# Patient Record
Sex: FEMALE | Race: WHITE | NOT HISPANIC OR LATINO | Employment: FULL TIME | URBAN - METROPOLITAN AREA
[De-identification: names, ages, dates, MRNs, and addresses within clinical notes are randomized per-mention and may not be internally consistent; named-entity substitution may affect disease eponyms.]

---

## 2023-06-08 ENCOUNTER — OFFICE VISIT (OUTPATIENT)
Dept: PRIMARY CARE | Facility: CLINIC | Age: 58
End: 2023-06-08
Payer: COMMERCIAL

## 2023-06-08 VITALS
DIASTOLIC BLOOD PRESSURE: 88 MMHG | HEIGHT: 68 IN | BODY MASS INDEX: 21.98 KG/M2 | SYSTOLIC BLOOD PRESSURE: 131 MMHG | HEART RATE: 65 BPM | WEIGHT: 145 LBS

## 2023-06-08 DIAGNOSIS — Z00.00 ROUTINE GENERAL MEDICAL EXAMINATION AT A HEALTH CARE FACILITY: Primary | ICD-10-CM

## 2023-06-08 DIAGNOSIS — Z82.49 FAMILY HISTORY OF AORTIC DISSECTION: ICD-10-CM

## 2023-06-08 PROCEDURE — 93000 ELECTROCARDIOGRAM COMPLETE: CPT | Performed by: INTERNAL MEDICINE

## 2023-06-08 PROCEDURE — 99386 PREV VISIT NEW AGE 40-64: CPT | Performed by: INTERNAL MEDICINE

## 2023-06-08 PROCEDURE — 1036F TOBACCO NON-USER: CPT | Performed by: INTERNAL MEDICINE

## 2023-06-09 PROBLEM — M21.42 ACQUIRED PES PLANUS OF LEFT FOOT: Status: RESOLVED | Noted: 2023-02-01 | Resolved: 2023-06-09

## 2023-06-09 PROBLEM — N93.9 ABNORMAL UTERINE BLEEDING: Status: RESOLVED | Noted: 2023-06-09 | Resolved: 2023-06-09

## 2023-06-09 PROBLEM — M76.821 POSTERIOR TIBIAL TENDINITIS, RIGHT LEG: Status: RESOLVED | Noted: 2023-06-09 | Resolved: 2023-06-09

## 2023-06-09 PROBLEM — G43.719 INTRACTABLE CHRONIC MIGRAINE WITHOUT AURA AND WITHOUT STATUS MIGRAINOSUS: Status: ACTIVE | Noted: 2023-06-09

## 2023-06-09 PROBLEM — M77.11 RIGHT LATERAL EPICONDYLITIS: Status: RESOLVED | Noted: 2023-06-09 | Resolved: 2023-06-09

## 2023-06-09 PROBLEM — M20.21 ACQUIRED HALLUX RIGIDUS OF RIGHT FOOT: Status: RESOLVED | Noted: 2022-09-29 | Resolved: 2023-06-09

## 2023-06-09 PROBLEM — H81.10 BENIGN POSITIONAL VERTIGO: Status: RESOLVED | Noted: 2023-06-09 | Resolved: 2023-06-09

## 2023-06-09 PROBLEM — R92.8 ABNORMAL FINDING ON MAMMOGRAPHY: Status: RESOLVED | Noted: 2023-06-09 | Resolved: 2023-06-09

## 2023-06-09 PROBLEM — M67.431 GANGLION CYST OF VOLAR ASPECT OF RIGHT WRIST: Status: RESOLVED | Noted: 2023-06-09 | Resolved: 2023-06-09

## 2023-06-09 PROBLEM — G47.00 INSOMNIA: Status: ACTIVE | Noted: 2023-06-09

## 2023-06-09 PROBLEM — L40.50 PSORIATIC ARTHRITIS (MULTI): Status: ACTIVE | Noted: 2020-02-21

## 2023-06-09 PROBLEM — M76.829 TIBIALIS TENDINITIS: Status: RESOLVED | Noted: 2022-09-29 | Resolved: 2023-06-09

## 2023-06-09 PROBLEM — M26.659 TMJ ARTHROPATHY: Status: RESOLVED | Noted: 2023-06-09 | Resolved: 2023-06-09

## 2023-06-09 PROBLEM — L40.9 PSORIASIS: Status: ACTIVE | Noted: 2020-02-21

## 2023-06-09 PROBLEM — N92.0 MENORRHAGIA WITH REGULAR CYCLE: Status: RESOLVED | Noted: 2023-06-09 | Resolved: 2023-06-09

## 2023-06-09 PROBLEM — K62.5 RECTAL HEMORRHAGE: Status: RESOLVED | Noted: 2023-06-09 | Resolved: 2023-06-09

## 2023-06-09 PROBLEM — K21.9 GERD (GASTROESOPHAGEAL REFLUX DISEASE): Status: ACTIVE | Noted: 2023-06-09

## 2023-06-09 PROBLEM — M72.2 PLANTAR FASCIAL FIBROMATOSIS: Status: ACTIVE | Noted: 2022-09-29

## 2023-06-09 PROBLEM — M21.41 ACQUIRED PES PLANUS OF RIGHT FOOT: Status: RESOLVED | Noted: 2023-02-01 | Resolved: 2023-06-09

## 2023-06-09 PROBLEM — E23.6 CYST OF PITUITARY GLAND (MULTI): Status: RESOLVED | Noted: 2023-06-09 | Resolved: 2023-06-09

## 2023-06-09 PROBLEM — M76.822 POSTERIOR TIBIAL TENDINITIS, LEFT LEG: Status: RESOLVED | Noted: 2023-02-01 | Resolved: 2023-06-09

## 2023-06-09 RX ORDER — SUMATRIPTAN SUCCINATE 100 MG/1
TABLET ORAL
COMMUNITY
Start: 2017-10-10

## 2023-06-09 RX ORDER — LEFLUNOMIDE 20 MG/1
20 TABLET ORAL DAILY
COMMUNITY

## 2023-06-09 RX ORDER — VIT C/E/ZN/COPPR/LUTEIN/ZEAXAN 250MG-90MG
CAPSULE ORAL
COMMUNITY
End: 2023-11-28 | Stop reason: ALTCHOICE

## 2023-06-09 RX ORDER — GALCANEZUMAB 120 MG/ML
1 INJECTION, SOLUTION SUBCUTANEOUS
COMMUNITY
End: 2023-11-28 | Stop reason: ALTCHOICE

## 2023-06-09 ASSESSMENT — ENCOUNTER SYMPTOMS
SINUS PAIN: 0
ARTHRALGIAS: 1
DIARRHEA: 0
COLOR CHANGE: 0
NAUSEA: 0
VOMITING: 0
VOICE CHANGE: 0
DYSURIA: 0
HEADACHES: 0
BRUISES/BLEEDS EASILY: 0
ADENOPATHY: 0
CHEST TIGHTNESS: 0
FEVER: 0
HEMATURIA: 0
FATIGUE: 0
DECREASED CONCENTRATION: 0
WHEEZING: 0
JOINT SWELLING: 1
LIGHT-HEADEDNESS: 0
NECK STIFFNESS: 0
CHILLS: 0
BLOOD IN STOOL: 0
POLYPHAGIA: 0
SHORTNESS OF BREATH: 0
SINUS PRESSURE: 0
ABDOMINAL PAIN: 0
SLEEP DISTURBANCE: 0
NERVOUS/ANXIOUS: 0
ABDOMINAL DISTENTION: 0
ACTIVITY CHANGE: 0
PHOTOPHOBIA: 0
DYSPHORIC MOOD: 0
HYPERACTIVE: 0
DIFFICULTY URINATING: 0
COUGH: 0
FREQUENCY: 0
WEAKNESS: 0
NUMBNESS: 0
MYALGIAS: 1
DIZZINESS: 0
SORE THROAT: 0
PALPITATIONS: 0
EYE DISCHARGE: 0

## 2023-06-09 NOTE — PATIENT INSTRUCTIONS
It was a pleasure meeting you in the office.  At a time that is convenient for you, please obtain your bloodwork on a fasting basis.  We will then follow up on these results once available.  Colonoscopy remains up-to-date from 2021, not needing to be repeated for total of 10 years.  Please continue with routine gynecologic exams and annual mammograms.  Please check with your rheumatologist whether you can proceed with the shingles vaccine series and a tetanus vaccine update.  I have placed an order for an ultrasound of the heart, called an echo, because of your mother's history of aortic dissection.  Please schedule at your convenience.  If you have any questions, please feel free to contact us.  If all remains well otherwise, we will plan on simply seeing you annually for your wellness visits.

## 2023-06-09 NOTE — PROGRESS NOTES
Vicki Wang comes in today to establish with a new PCP and for a comprehensive physical exam.      Ms. Wang comes in today to establish with a new PCP and for a comprehensive physical exam.  She has a history of psoriatic arthritis and is followed closely by rheumatology.  She is well managed on her current injectable therapy which she recently started in the past few years.  She does follow with a dermatologist also with a neurologist for her migraines.  She feels well in general.  Her arthritis pain is her biggest issue, mainly prominent in the feet, ankles, and knees.  Again, this has improved tremendously with the injectable immuno modulators.  She does have sciatic pain, has gone through physical therapy and injections and this has improved.  Migraines are well managed with the monthly injection.  She feels well overall.  She denies any headaches, dizziness, chest pain, palpitations, shortness of breath nor cough.  She tries to keep up with her routine gynecologic exams as well as healthcare maintenance screening.  She is amenable to having lab work updated.      Review of Systems   Constitutional:  Negative for activity change, chills, fatigue and fever.   HENT:  Negative for congestion, ear pain, mouth sores, nosebleeds, sinus pressure, sinus pain, sore throat, tinnitus and voice change.    Eyes:  Negative for photophobia and discharge.   Respiratory:  Negative for cough, chest tightness, shortness of breath and wheezing.    Cardiovascular:  Negative for chest pain, palpitations and leg swelling.   Gastrointestinal:  Negative for abdominal distention, abdominal pain, blood in stool, diarrhea, nausea and vomiting.   Endocrine: Negative for cold intolerance, polyphagia and polyuria.   Genitourinary:  Negative for difficulty urinating, dysuria, frequency, hematuria, pelvic pain, vaginal bleeding and vaginal discharge.   Musculoskeletal:  Positive for arthralgias, joint swelling and myalgias. Negative for neck  stiffness.   Skin:  Negative for color change, pallor and rash.   Allergic/Immunologic: Negative for environmental allergies, food allergies and immunocompromised state.   Neurological:  Negative for dizziness, syncope, weakness, light-headedness, numbness and headaches.   Hematological:  Negative for adenopathy. Does not bruise/bleed easily.   Psychiatric/Behavioral:  Negative for behavioral problems, decreased concentration, dysphoric mood and sleep disturbance. The patient is not nervous/anxious and is not hyperactive.        Objective   Physical Exam  Constitutional:       General: She is not in acute distress.     Appearance: Normal appearance. She is well-developed. She is not ill-appearing.   HENT:      Head: Normocephalic.      Right Ear: Tympanic membrane, ear canal and external ear normal.      Left Ear: Tympanic membrane, ear canal and external ear normal.      Nose: Nose normal.      Mouth/Throat:      Mouth: Mucous membranes are moist.      Pharynx: Oropharynx is clear. No oropharyngeal exudate or posterior oropharyngeal erythema.   Eyes:      General: Lids are normal. No scleral icterus.     Extraocular Movements: Extraocular movements intact.      Conjunctiva/sclera: Conjunctivae normal.      Pupils: Pupils are equal, round, and reactive to light.   Neck:      Vascular: No carotid bruit.   Cardiovascular:      Rate and Rhythm: Normal rate and regular rhythm.      Pulses: Normal pulses.      Heart sounds: No murmur heard.  Pulmonary:      Effort: Pulmonary effort is normal. No respiratory distress.      Breath sounds: No wheezing, rhonchi or rales.   Chest:      Comments: Deferred to gyn    Abdominal:      General: Bowel sounds are normal. There is no distension.      Palpations: Abdomen is soft. There is no mass.      Tenderness: There is no abdominal tenderness. There is no guarding.   Genitourinary:     Comments: Deferred to gyn  Musculoskeletal:      Cervical back: Normal range of motion and neck  supple. No tenderness.      Right lower leg: No edema.      Left lower leg: No edema.   Lymphadenopathy:      Cervical: No cervical adenopathy.   Skin:     General: Skin is warm and dry.      Coloration: Skin is not pale.      Findings: No bruising or rash.   Neurological:      General: No focal deficit present.      Mental Status: She is alert and oriented to person, place, and time.      Cranial Nerves: No cranial nerve deficit.      Motor: No weakness.      Gait: Gait normal.   Psychiatric:         Mood and Affect: Mood normal.         Behavior: Behavior normal.         Judgment: Judgment normal.     Assessment/Plan     Full age-appropriate comprehensive physical exam and health care maintenance performed and discussed today.  Routine safety and preventative measures discussed including self breast exam, seatbelt use, no drinking and driving, no texting and driving, abstinence or cessation of tobacco use, routine dental and vision exams, healthy diet and regular exercise.    We will update comprehensive labs and follow-up on results once available.  Mammogram up-to-date, followed closely by gynecology.  DEXA screening will start age-appropriate times in the future.  EKG updated as above.  Colonoscopy is up-to-date from 2021, not needing to be repeated for total of 10 years.  I have discussed recommendations for shingles vaccine and updating tetanus vaccine.  She would like to discuss with her rheumatologist regarding coming off of immunosuppressants prior to injections.  She will notify us if she would like to proceed.  Recommend continuing with annual flu shots and COVID boosters with pneumonia vaccine at age 67.    I would like to proceed with an echo because of her mother's history of aortic dissection.  She will contact us with any additional questions or concerns, otherwise, we are happy to see her annually for her wellness visits.  Problem List Items Addressed This Visit    None  Visit Diagnoses        Routine general medical examination at a health care facility    -  Primary    Relevant Orders    ECG 12 lead (Clinic Performed)    Lipid Panel    Hemoglobin A1C    Vitamin D, Total    Vitamin B12    Iron and TIBC    TSH with reflex to Free T4 if abnormal

## 2023-07-06 ENCOUNTER — LAB (OUTPATIENT)
Dept: LAB | Facility: LAB | Age: 58
End: 2023-07-06
Payer: COMMERCIAL

## 2023-07-06 DIAGNOSIS — Z00.00 ROUTINE GENERAL MEDICAL EXAMINATION AT A HEALTH CARE FACILITY: ICD-10-CM

## 2023-07-06 LAB
CALCIDIOL (25 OH VITAMIN D3) (NG/ML) IN SER/PLAS: 144 NG/ML
CHOLESTEROL (MG/DL) IN SER/PLAS: 238 MG/DL (ref 0–199)
CHOLESTEROL IN HDL (MG/DL) IN SER/PLAS: 62.2 MG/DL
CHOLESTEROL/HDL RATIO: 3.8
COBALAMIN (VITAMIN B12) (PG/ML) IN SER/PLAS: 398 PG/ML (ref 211–911)
ESTIMATED AVERAGE GLUCOSE FOR HBA1C: 97 MG/DL
HEMOGLOBIN A1C/HEMOGLOBIN TOTAL IN BLOOD: 5 %
IRON (UG/DL) IN SER/PLAS: 155 UG/DL (ref 35–150)
IRON BINDING CAPACITY (UG/DL) IN SER/PLAS: 336 UG/DL (ref 240–445)
IRON SATURATION (%) IN SER/PLAS: 46 % (ref 25–45)
LDL: 159 MG/DL (ref 0–99)
THYROTROPIN (MIU/L) IN SER/PLAS BY DETECTION LIMIT <= 0.05 MIU/L: 2.75 MIU/L (ref 0.44–3.98)
TRIGLYCERIDE (MG/DL) IN SER/PLAS: 86 MG/DL (ref 0–149)
VLDL: 17 MG/DL (ref 0–40)

## 2023-07-06 PROCEDURE — 83036 HEMOGLOBIN GLYCOSYLATED A1C: CPT

## 2023-07-06 PROCEDURE — 82607 VITAMIN B-12: CPT

## 2023-07-06 PROCEDURE — 36415 COLL VENOUS BLD VENIPUNCTURE: CPT

## 2023-07-06 PROCEDURE — 80061 LIPID PANEL: CPT

## 2023-07-06 PROCEDURE — 82306 VITAMIN D 25 HYDROXY: CPT

## 2023-07-06 PROCEDURE — 83550 IRON BINDING TEST: CPT

## 2023-07-06 PROCEDURE — 83540 ASSAY OF IRON: CPT

## 2023-07-06 PROCEDURE — 84443 ASSAY THYROID STIM HORMONE: CPT

## 2023-09-05 ENCOUNTER — OFFICE VISIT (OUTPATIENT)
Dept: PRIMARY CARE | Facility: CLINIC | Age: 58
End: 2023-09-05
Payer: COMMERCIAL

## 2023-09-05 ENCOUNTER — LAB (OUTPATIENT)
Dept: LAB | Facility: LAB | Age: 58
End: 2023-09-05
Payer: COMMERCIAL

## 2023-09-05 DIAGNOSIS — B34.9 VIRAL ILLNESS: ICD-10-CM

## 2023-09-05 DIAGNOSIS — R10.13 DYSPEPSIA: ICD-10-CM

## 2023-09-05 DIAGNOSIS — R20.0 NUMBNESS AND TINGLING OF LEFT LEG: Primary | ICD-10-CM

## 2023-09-05 DIAGNOSIS — M53.3 SI (SACROILIAC) JOINT DYSFUNCTION: ICD-10-CM

## 2023-09-05 DIAGNOSIS — R20.0 NUMBNESS AND TINGLING OF LEFT LEG: ICD-10-CM

## 2023-09-05 DIAGNOSIS — R20.2 NUMBNESS AND TINGLING OF LEFT LEG: ICD-10-CM

## 2023-09-05 DIAGNOSIS — R20.2 NUMBNESS AND TINGLING OF LEFT LEG: Primary | ICD-10-CM

## 2023-09-05 LAB
ANION GAP IN SER/PLAS: 12 MMOL/L (ref 10–20)
BASOPHILS (10*3/UL) IN BLOOD BY AUTOMATED COUNT: 0.04 X10E9/L (ref 0–0.1)
BASOPHILS/100 LEUKOCYTES IN BLOOD BY AUTOMATED COUNT: 1.2 % (ref 0–2)
CALCIUM (MG/DL) IN SER/PLAS: 9.6 MG/DL (ref 8.6–10.6)
CARBON DIOXIDE, TOTAL (MMOL/L) IN SER/PLAS: 28 MMOL/L (ref 21–32)
CHLORIDE (MMOL/L) IN SER/PLAS: 103 MMOL/L (ref 98–107)
COBALAMIN (VITAMIN B12) (PG/ML) IN SER/PLAS: 305 PG/ML (ref 211–911)
CREATININE (MG/DL) IN SER/PLAS: 0.96 MG/DL (ref 0.5–1.05)
EOSINOPHILS (10*3/UL) IN BLOOD BY AUTOMATED COUNT: 0.05 X10E9/L (ref 0–0.7)
EOSINOPHILS/100 LEUKOCYTES IN BLOOD BY AUTOMATED COUNT: 1.5 % (ref 0–6)
ERYTHROCYTE DISTRIBUTION WIDTH (RATIO) BY AUTOMATED COUNT: 11.9 % (ref 11.5–14.5)
ERYTHROCYTE MEAN CORPUSCULAR HEMOGLOBIN CONCENTRATION (G/DL) BY AUTOMATED: 32.9 G/DL (ref 32–36)
ERYTHROCYTE MEAN CORPUSCULAR VOLUME (FL) BY AUTOMATED COUNT: 93 FL (ref 80–100)
ERYTHROCYTES (10*6/UL) IN BLOOD BY AUTOMATED COUNT: 4.54 X10E12/L (ref 4–5.2)
GFR FEMALE: 70 ML/MIN/1.73M2
GLUCOSE (MG/DL) IN SER/PLAS: 91 MG/DL (ref 74–99)
HEMATOCRIT (%) IN BLOOD BY AUTOMATED COUNT: 42.2 % (ref 36–46)
HEMOGLOBIN (G/DL) IN BLOOD: 13.9 G/DL (ref 12–16)
IMMATURE GRANULOCYTES/100 LEUKOCYTES IN BLOOD BY AUTOMATED COUNT: 0 % (ref 0–0.9)
LEUKOCYTES (10*3/UL) IN BLOOD BY AUTOMATED COUNT: 3.3 X10E9/L (ref 4.4–11.3)
LYMPHOCYTES (10*3/UL) IN BLOOD BY AUTOMATED COUNT: 1.07 X10E9/L (ref 1.2–4.8)
LYMPHOCYTES/100 LEUKOCYTES IN BLOOD BY AUTOMATED COUNT: 32.4 % (ref 13–44)
MAGNESIUM (MG/DL) IN SER/PLAS: 2.18 MG/DL (ref 1.6–2.4)
MONOCYTES (10*3/UL) IN BLOOD BY AUTOMATED COUNT: 0.36 X10E9/L (ref 0.1–1)
MONOCYTES/100 LEUKOCYTES IN BLOOD BY AUTOMATED COUNT: 10.9 % (ref 2–10)
NEUTROPHILS (10*3/UL) IN BLOOD BY AUTOMATED COUNT: 1.78 X10E9/L (ref 1.2–7.7)
NEUTROPHILS/100 LEUKOCYTES IN BLOOD BY AUTOMATED COUNT: 54 % (ref 40–80)
NRBC (PER 100 WBCS) BY AUTOMATED COUNT: 0 /100 WBC (ref 0–0)
PLATELETS (10*3/UL) IN BLOOD AUTOMATED COUNT: 217 X10E9/L (ref 150–450)
POTASSIUM (MMOL/L) IN SER/PLAS: 4.3 MMOL/L (ref 3.5–5.3)
SODIUM (MMOL/L) IN SER/PLAS: 139 MMOL/L (ref 136–145)
THYROTROPIN (MIU/L) IN SER/PLAS BY DETECTION LIMIT <= 0.05 MIU/L: 3.85 MIU/L (ref 0.44–3.98)
UREA NITROGEN (MG/DL) IN SER/PLAS: 18 MG/DL (ref 6–23)

## 2023-09-05 PROCEDURE — 85025 COMPLETE CBC W/AUTO DIFF WBC: CPT

## 2023-09-05 PROCEDURE — 82607 VITAMIN B-12: CPT

## 2023-09-05 PROCEDURE — 36415 COLL VENOUS BLD VENIPUNCTURE: CPT

## 2023-09-05 PROCEDURE — 99214 OFFICE O/P EST MOD 30 MIN: CPT | Performed by: INTERNAL MEDICINE

## 2023-09-05 PROCEDURE — 1036F TOBACCO NON-USER: CPT | Performed by: INTERNAL MEDICINE

## 2023-09-05 PROCEDURE — 80048 BASIC METABOLIC PNL TOTAL CA: CPT

## 2023-09-05 PROCEDURE — 83735 ASSAY OF MAGNESIUM: CPT

## 2023-09-05 PROCEDURE — 84443 ASSAY THYROID STIM HORMONE: CPT

## 2023-09-05 ASSESSMENT — ENCOUNTER SYMPTOMS
BACK PAIN: 1
VOMITING: 0
HEADACHES: 1
ABDOMINAL DISTENTION: 0
PALPITATIONS: 0
NUMBNESS: 1
NAUSEA: 1
WHEEZING: 0
DIARRHEA: 1
ABDOMINAL PAIN: 1
WEAKNESS: 0
LIGHT-HEADEDNESS: 0
SHORTNESS OF BREATH: 0
CHEST TIGHTNESS: 0
FATIGUE: 1
ARTHRALGIAS: 1
ACTIVITY CHANGE: 0
FEVER: 1
DIZZINESS: 0
COUGH: 0

## 2023-09-05 NOTE — PROGRESS NOTES
"Vicki Wang comes in today for foot tingling.      Ms. Wang comes in today with concerns of foot tingling.  She states that this has been going on for about 2 weeks.  She always has had some SI joint and sciatic issues and has worked with physical therapy in the past.  This seems to be typically managed reasonably well, but again recently has had this nonspecific tingling.  This will come and go, sometimes in her lower leg, sometimes in her thigh, but mainly she feels just a slight tingling in her third toe on her left foot.  She states that she has had a diagnosis of \"peripheral neuropathy\" in the past.    Also about 1 week ago, she had an nonspecific viral type illness.  She became extremely tired, had a headache, and a suspected fever (not documented).  She had a decreased appetite.  She had diarrhea for 24 hours.  She started getting better but then had some wine over the weekend and this caused quite a bit of upper GI dyspepsia symptoms.  She feels as though sometimes food or liquid has a tough time passing into her stomach, but not a true dysphagia.  Her  had a similar episode as well, seemingly something viral and improving currently but not quite back to baseline.  Her lab work in June all looked reassuring.  Vitamin D was significantly high and she has since stopped taking vitamin D supplements.  She continues to follow with her specialists.  She denies any additional concerns at this time that are new.        Review of Systems   Constitutional:  Positive for fatigue and fever. Negative for activity change.   Respiratory:  Negative for cough, chest tightness, shortness of breath and wheezing.    Cardiovascular:  Negative for chest pain, palpitations and leg swelling.   Gastrointestinal:  Positive for abdominal pain, diarrhea (resolved) and nausea (resolved). Negative for abdominal distention and vomiting.   Musculoskeletal:  Positive for arthralgias and back pain.   Neurological:  Positive for " numbness and headaches. Negative for dizziness, syncope, weakness and light-headedness.       Objective   Physical Exam  Constitutional:       General: She is not in acute distress.     Appearance: She is normal weight. She is not diaphoretic.   Cardiovascular:      Rate and Rhythm: Normal rate and regular rhythm.      Heart sounds: No murmur heard.     No gallop.   Pulmonary:      Effort: Pulmonary effort is normal. No respiratory distress.   Abdominal:      General: Abdomen is flat. Bowel sounds are normal. There is no distension.      Tenderness: There is no abdominal tenderness. There is no guarding.      Hernia: No hernia is present.   Musculoskeletal:      Right lower leg: No edema.      Left lower leg: No edema.   Neurological:      General: No focal deficit present.      Mental Status: She is alert. Mental status is at baseline.      Cranial Nerves: No cranial nerve deficit.      Motor: No weakness.      Gait: Gait normal.      Deep Tendon Reflexes: Reflexes normal.         Assessment/Plan   1.  Numbness and tingling of the left leg with history of SI joint dysfunction: I suspect that this is all related to SI joint spasm.  Referral placed back for physical therapy as this has been helpful for her in the past.  Could consider lumbar x-rays if symptoms persist.  2.  Dyspepsia: Likely related to recent viral illness.  Have recommended over-the-counter PPI therapy.  Contact us if symptoms persist or worsen.  Update labs to ensure no change.  3.  Viral illness: Seems to be resolving.  Have recommended hydrating fluids.  Update comprehensive labs.    Happy to see her back if symptoms persist or worsen.  Otherwise, annual visits as scheduled.  She is to call with any questions.  Problem List Items Addressed This Visit    None

## 2023-09-05 NOTE — PATIENT INSTRUCTIONS
We will follow-up on lab studies.  Referral for physical therapy has been placed as well.  Please continue with hydrating fluids.  If symptoms continue or worsen, please contact us promptly.  Otherwise, we will follow-up as previously scheduled.

## 2023-11-13 ENCOUNTER — SPECIALTY PHARMACY (OUTPATIENT)
Dept: PHARMACY | Facility: CLINIC | Age: 58
End: 2023-11-13

## 2023-11-13 ENCOUNTER — PHARMACY VISIT (OUTPATIENT)
Dept: PHARMACY | Facility: CLINIC | Age: 58
End: 2023-11-13
Payer: COMMERCIAL

## 2023-11-14 ENCOUNTER — SPECIALTY PHARMACY (OUTPATIENT)
Dept: PHARMACY | Facility: CLINIC | Age: 58
End: 2023-11-14

## 2023-11-17 ENCOUNTER — PHARMACY VISIT (OUTPATIENT)
Dept: PHARMACY | Facility: CLINIC | Age: 58
End: 2023-11-17
Payer: COMMERCIAL

## 2023-11-17 PROCEDURE — RXMED WILLOW AMBULATORY MEDICATION CHARGE

## 2023-11-27 ENCOUNTER — TELEPHONE (OUTPATIENT)
Dept: NEUROLOGY | Facility: CLINIC | Age: 58
End: 2023-11-27

## 2023-11-28 ENCOUNTER — TELEMEDICINE (OUTPATIENT)
Dept: NEUROLOGY | Facility: CLINIC | Age: 58
End: 2023-11-28
Payer: COMMERCIAL

## 2023-11-28 ENCOUNTER — APPOINTMENT (OUTPATIENT)
Dept: NEUROLOGY | Facility: CLINIC | Age: 58
End: 2023-11-28
Payer: COMMERCIAL

## 2023-11-28 DIAGNOSIS — G43.719 INTRACTABLE CHRONIC MIGRAINE WITHOUT AURA AND WITHOUT STATUS MIGRAINOSUS: Primary | ICD-10-CM

## 2023-11-28 PROCEDURE — 99213 OFFICE O/P EST LOW 20 MIN: CPT | Performed by: NURSE PRACTITIONER

## 2023-11-28 NOTE — PATIENT INSTRUCTIONS
Migraines:  Suggestions for preventing or controlling migraines:  - Riboflavin (vitamin B2) 200 mg twice a day may be helpful. Side effects can include diarrhea, increased urination and bright yellow urine. This should not be taken by kids.   - CoQ10 100 mg daily up to three times per day may also be helpful. Side effects can include nausea, diarrhea, and dyspepsia.   - Magnesium (oxelate) 400- 600 mg daily for prevention. Magnesium can also help with menstrual migraines. Side effects can include diarrhea and flushing.   - According to the American Academy of Neurology, there is not sufficient evidence that melatonin helps prevent or treat migraines, so it is not currently recommended for this use. Butterbur is also not currently recommended for migraine prevention as it can cause liver toxicity.   - Avoid triggers that can cause or worsen migraines (food, lack of sleep, stress, etc.)  - Headache frequency is noted to be increased in those with low physical activity, poor sleep, high BMI, smoking, and caffeine overuse. Managing stress with relaxation techniques and getting 20-30 minutes of aerobic exercise at least 4 times per week can help decrease headache frequency and intensity.   - Keep a diary of your headaches to note triggers, how often you get them, how long they last, associated symptoms, what medicines you took and if they helped, and any other helpful information   - Avoid taking rescue medication (NOT preventative medication) more than 3 days a week (includes both prescription and over the counter meds)  - Take your preventative medication as directed. Let me know if you have side effects or problems with the medication. Do not suddenly stop the medication.

## 2023-11-28 NOTE — PROGRESS NOTES
SUBJECTIVE    Vicki Wang is a 58 y.o. right-handed female who presents with   Chief Complaint   Patient presents with    Migraine      .    Presents for follow up visit  Visit type: virtual visit Virtual or Telephone Consent    An interactive audio and video telecommunication system which permits real time communications between the patient (at the originating site) and provider (at the distant site) was utilized to provide this telehealth service.   Verbal consent was requested and obtained from Vicki Wang on this date, 11/28/23 for a telehealth visit.     HISTORY OF PRESENT ILLNESS    RECAP:  Former patient of Dr. Driver.    Previous Treatment:   Abortive therapy tried: Triptans , Relpax ( caused exhaustion/tiredness), Sumatriptan, Treximet, Cambia     Prophylaxis tried:   Topamax (rash), gabapentin (dizzy, fatigued), tizanidine (fatigued) , nortriptyline (insomnia.) Has also tried butterbur and magnesium with no benefit.     12/13/22:     Emgality working great. No breakthrough migraines since starting it. Has not needed to use any sumatriptan.      Pt is doing well, denies any other complaints. Asking about safety of Emgality and drug class.    11/28/23 -     Migraines/HA:  She does not currently have a migraine. Since the last visit, migraines are no change.  Headaches are typically located: right-sided unilateral, left-sided unilateral, frontal   Describes as: sharp  Currently frequency: rare   Baseline frequency:  1-2 times per week   Vicki does not experience headache aura.  Associated symptoms: photophobia  Symptoms associated with increased intracranial pressure: No increased intracranial pressure symptoms  Autonomic symptoms: None  Triggers: Patient is aware of none, used to be red wine or alcohol, no triggers currently.   Her migraines do not seem to be related to any time of day or year      REVIEW OF SYSTEMS:  10 point review of systems performed and is negative except as noted in the HPI.      Past Medical History:   Diagnosis Date    Abnormal uterine bleeding 06/09/2023    Acquired pes planus of left foot 02/01/2023    Acquired pes planus of right foot 02/01/2023    Baker's cyst, left     Benign positional vertigo 06/09/2023    Ganglion cyst of volar aspect of right wrist 06/09/2023    Menorrhagia with regular cycle 06/09/2023    Posterior tibial tendinitis, left leg 02/01/2023    Posterior tibial tendinitis, right leg 06/09/2023    Rectal hemorrhage 06/09/2023    Right lateral epicondylitis 06/09/2023    Tibialis tendinitis 09/29/2022    TMJ arthropathy 06/09/2023       Family medical history includes migraines in sister.    Past Surgical History:   Procedure Laterality Date    DILATION AND CURETTAGE OF UTERUS  03/19/2018    Dilation And Curettage    GANGLION CYST EXCISION      WISDOM TOOTH EXTRACTION         Social History     Substance and Sexual Activity   Drug Use Not on file     Tobacco Use: Medium Risk (11/28/2023)    Patient History     Smoking Tobacco Use: Former     Smokeless Tobacco Use: Never     Passive Exposure: Not on file     Alcohol Use: Not on file       Current Outpatient Medications on File Prior to Visit   Medication Sig Dispense Refill    leflunomide (Arava) 20 mg tablet Take 1 tablet (20 mg) by mouth once daily.      SUMAtriptan (Imitrex) 100 mg tablet Take by mouth.      [DISCONTINUED] galcanezumab (Emgality) 120 mg/mL auto-injector INJECT 120 MG (1 PEN) UNDER THE SKIN ONCE A MONTH AS DIRECTED. 1 mL 11    [DISCONTINUED] cholecalciferol (Vitamin D-3) 25 MCG (1000 UT) capsule Take by mouth.      [DISCONTINUED] fish oil concentrate (Omega-3) 120-180 mg capsule Take by mouth.      [DISCONTINUED] galcanezumab (Emgality Syringe) 120 mg/mL prefilled syringe Inject 1 Syringe (120 mg) under the skin.       No current facility-administered medications on file prior to visit.         OBJECTIVE    There were no vitals taken for this visit.      Physical Exam  Eyes:      General: Lids are  normal.      Extraocular Movements: Extraocular movements intact.   Psychiatric:         Speech: Speech normal.       Neurological Exam  Mental Status  Awake, alert and oriented to person, place and time. Oriented to person, place, time and situation. Recent and remote memory are intact. Speech is normal. Language is fluent with no aphasia. Attention and concentration are normal. Fund of knowledge is appropriate for level of education.    Cranial Nerves  CN III, IV, VI: Extraocular movements intact bilaterally. Normal lids and orbits bilaterally.  CN VII: Full and symmetric facial movement.  CN VIII: Hearing is normal.    Motor   No abnormal involuntary movements.        No results found for this or any previous visit from the past 1825 days.      Below is the patient's most recent value for Albumin, ALT, AST, BUN, Calcium, Chloride, Cholesterol, CO2, Creatinine, GFR, Glucose, HDL, Hematocrit, Hemoglobin, Hemoglobin A1C, LDL, Magnesium, Phosphorus, Platelets, Potassium, PSA, Sodium, Triglycerides, and WBC.   Lab Results   Component Value Date    BUN 18 09/05/2023    CALCIUM 9.6 09/05/2023     09/05/2023    CHOL 238 (H) 07/06/2023    CO2 28 09/05/2023    CREATININE 0.96 09/05/2023    HDL 62.2 07/06/2023    HCT 42.2 09/05/2023    HGB 13.9 09/05/2023    HGBA1C 5.0 07/06/2023    MG 2.18 09/05/2023     09/05/2023    K 4.3 09/05/2023     09/05/2023    TRIG 86 07/06/2023    WBC 3.3 (L) 09/05/2023     Note: for a comprehensive list of the patient's lab results, access the Results Review activity.      ASSESSMENT & PLAN  Problem List Items Addressed This Visit       Intractable chronic migraine without aura and without status migrainosus - Primary    Overview     Currently well controlled on Emgality monthly injection.   Failed topiramate, gabapentin, nortriptyline.  Failed relpax, sumatriptan, Treximet, Cambia and tizanidine.          Current Assessment & Plan     Continue Emgality monthly. [facility]   FU in 1 year.          Relevant Medications    galcanezumab (Emgality) 120 mg/mL auto-injector         FU in 1 year

## 2023-12-07 ENCOUNTER — SPECIALTY PHARMACY (OUTPATIENT)
Dept: PHARMACY | Facility: CLINIC | Age: 58
End: 2023-12-07

## 2023-12-11 ENCOUNTER — SPECIALTY PHARMACY (OUTPATIENT)
Dept: PHARMACY | Facility: CLINIC | Age: 58
End: 2023-12-11

## 2023-12-19 ENCOUNTER — SPECIALTY PHARMACY (OUTPATIENT)
Dept: PHARMACY | Facility: CLINIC | Age: 58
End: 2023-12-19

## 2023-12-20 DIAGNOSIS — G43.719 INTRACTABLE CHRONIC MIGRAINE WITHOUT AURA AND WITHOUT STATUS MIGRAINOSUS: Primary | ICD-10-CM

## 2024-01-11 DIAGNOSIS — G43.719 INTRACTABLE CHRONIC MIGRAINE WITHOUT AURA AND WITHOUT STATUS MIGRAINOSUS: Primary | ICD-10-CM

## 2024-01-15 RX ORDER — GALCANEZUMAB 120 MG/ML
INJECTION, SOLUTION SUBCUTANEOUS
Qty: 1 ML | Refills: 11 | Status: SHIPPED | OUTPATIENT
Start: 2024-01-15 | End: 2025-01-13

## 2024-01-16 PROCEDURE — RXMED WILLOW AMBULATORY MEDICATION CHARGE

## 2024-01-17 ENCOUNTER — PHARMACY VISIT (OUTPATIENT)
Dept: PHARMACY | Facility: CLINIC | Age: 59
End: 2024-01-17
Payer: COMMERCIAL

## 2024-01-23 ENCOUNTER — SPECIALTY PHARMACY (OUTPATIENT)
Dept: PHARMACY | Facility: CLINIC | Age: 59
End: 2024-01-23

## 2024-02-09 PROCEDURE — RXMED WILLOW AMBULATORY MEDICATION CHARGE

## 2024-02-14 ENCOUNTER — PHARMACY VISIT (OUTPATIENT)
Dept: PHARMACY | Facility: CLINIC | Age: 59
End: 2024-02-14
Payer: COMMERCIAL

## 2024-03-07 PROCEDURE — RXMED WILLOW AMBULATORY MEDICATION CHARGE

## 2024-03-08 ENCOUNTER — SPECIALTY PHARMACY (OUTPATIENT)
Dept: PHARMACY | Facility: CLINIC | Age: 59
End: 2024-03-08

## 2024-03-11 ENCOUNTER — PHARMACY VISIT (OUTPATIENT)
Dept: PHARMACY | Facility: CLINIC | Age: 59
End: 2024-03-11
Payer: COMMERCIAL

## 2024-04-03 ENCOUNTER — SPECIALTY PHARMACY (OUTPATIENT)
Dept: PHARMACY | Facility: CLINIC | Age: 59
End: 2024-04-03

## 2024-04-03 PROCEDURE — RXMED WILLOW AMBULATORY MEDICATION CHARGE

## 2024-04-09 ENCOUNTER — PHARMACY VISIT (OUTPATIENT)
Dept: PHARMACY | Facility: CLINIC | Age: 59
End: 2024-04-09
Payer: COMMERCIAL

## 2024-05-03 ENCOUNTER — SPECIALTY PHARMACY (OUTPATIENT)
Dept: PHARMACY | Facility: CLINIC | Age: 59
End: 2024-05-03

## 2024-05-03 PROCEDURE — RXMED WILLOW AMBULATORY MEDICATION CHARGE

## 2024-05-16 ENCOUNTER — SPECIALTY PHARMACY (OUTPATIENT)
Dept: PHARMACY | Facility: CLINIC | Age: 59
End: 2024-05-16

## 2024-05-16 ENCOUNTER — PHARMACY VISIT (OUTPATIENT)
Dept: PHARMACY | Facility: CLINIC | Age: 59
End: 2024-05-16
Payer: COMMERCIAL

## 2024-06-10 PROCEDURE — RXMED WILLOW AMBULATORY MEDICATION CHARGE

## 2024-06-14 ENCOUNTER — PHARMACY VISIT (OUTPATIENT)
Dept: PHARMACY | Facility: CLINIC | Age: 59
End: 2024-06-14
Payer: COMMERCIAL

## 2024-06-24 ENCOUNTER — APPOINTMENT (OUTPATIENT)
Dept: OBSTETRICS AND GYNECOLOGY | Facility: CLINIC | Age: 59
End: 2024-06-24
Payer: COMMERCIAL

## 2024-06-24 VITALS
DIASTOLIC BLOOD PRESSURE: 84 MMHG | HEIGHT: 68 IN | BODY MASS INDEX: 21.37 KG/M2 | SYSTOLIC BLOOD PRESSURE: 124 MMHG | WEIGHT: 141 LBS

## 2024-06-24 DIAGNOSIS — R14.0 ABDOMINAL BLOATING: ICD-10-CM

## 2024-06-24 DIAGNOSIS — Z01.419 WELL WOMAN EXAM: Primary | ICD-10-CM

## 2024-06-24 DIAGNOSIS — R35.0 URINARY FREQUENCY: ICD-10-CM

## 2024-06-24 DIAGNOSIS — Z12.31 BREAST CANCER SCREENING BY MAMMOGRAM: ICD-10-CM

## 2024-06-24 PROCEDURE — 99213 OFFICE O/P EST LOW 20 MIN: CPT | Performed by: OBSTETRICS & GYNECOLOGY

## 2024-06-24 PROCEDURE — 1036F TOBACCO NON-USER: CPT | Performed by: OBSTETRICS & GYNECOLOGY

## 2024-06-24 PROCEDURE — 99396 PREV VISIT EST AGE 40-64: CPT | Performed by: OBSTETRICS & GYNECOLOGY

## 2024-06-24 ASSESSMENT — ENCOUNTER SYMPTOMS
ABDOMINAL DISTENTION: 0
CHILLS: 0
COLOR CHANGE: 0
ABDOMINAL PAIN: 0
FLANK PAIN: 0
FATIGUE: 0
BACK PAIN: 0
DYSURIA: 0
SHORTNESS OF BREATH: 0
FREQUENCY: 0
NAUSEA: 0
DIARRHEA: 0
HEMATURIA: 0
VOMITING: 0
APPETITE CHANGE: 0
BLOOD IN STOOL: 0
SLEEP DISTURBANCE: 0
FEVER: 0
UNEXPECTED WEIGHT CHANGE: 0
CONSTIPATION: 0

## 2024-06-24 ASSESSMENT — PAIN SCALES - GENERAL: PAINLEVEL: 0-NO PAIN

## 2024-06-24 NOTE — PROGRESS NOTES
"History Of Present Illness  Routine Gyn Exam  Vicki Wang here for routine WWE.  Pt is postmenopausal.  Denies spotting or bleeding.     Concerns: bloating, urinary frequency .     New health issues or surgeries since last visit: denies .  Exercise: regular exercise, rowing .     Gynecologic History  Postmenopausal.  Sexually active: yes.  Last Pap: .   Last mammogram: .   Last Colonoscopy:  up to date per patient .       Obstetric History  OB History    Para Term  AB Living   3       1 2   SAB IAB Ectopic Multiple Live Births   1              # Outcome Date GA Lbr Damian/2nd Weight Sex Delivery Anes PTL Lv   3             2             1 SAB                 Review of Systems   Constitutional:  Negative for appetite change, chills, fatigue, fever and unexpected weight change.   Respiratory:  Negative for shortness of breath.    Cardiovascular:  Negative for chest pain.   Gastrointestinal:  Negative for abdominal distention, abdominal pain, blood in stool, constipation, diarrhea, nausea and vomiting.   Endocrine: Negative for cold intolerance and heat intolerance.   Genitourinary:  Negative for dyspareunia, dysuria, flank pain, frequency, genital sores, hematuria, menstrual problem, pelvic pain, urgency, vaginal bleeding, vaginal discharge and vaginal pain.   Musculoskeletal:  Negative for back pain.   Skin:  Negative for color change.   Psychiatric/Behavioral:  Negative for sleep disturbance.        /84 (BP Location: Left arm, Patient Position: Sitting)   Ht 1.727 m (5' 8\")   Wt 64 kg (141 lb)   LMP 2020   BMI 21.44 kg/m²      Physical Exam  Constitutional:       Appearance: Normal appearance.   HENT:      Head: Normocephalic and atraumatic.   Chest:   Breasts:     Right: Normal.      Left: Normal.   Abdominal:      General: Abdomen is flat.      Palpations: Abdomen is soft.      Tenderness: There is no abdominal tenderness.   Genitourinary:     General: Normal vulva. "      Vagina: Normal.      Cervix: Normal.      Uterus: Normal.       Adnexa: Right adnexa normal and left adnexa normal.   Skin:     General: Skin is warm and dry.   Neurological:      Mental Status: She is alert and oriented to person, place, and time.   Psychiatric:         Mood and Affect: Mood normal.              Assessment/Plan         Routine Well Woman Exam Today  Discussed diet and exercise.   Reviewed routine health screenings.   Pap: 2023 wnl   Recommend annual mammograms. Mammogram ordered for 2024. Pt to schedule  Currently up to date on colon cancer screening.          Urinary frequency and bloating  Normal pelvic exam today  U/A wnl  TATV US ordered  Consider pelvic floor PT vs Urogyn consult  Pt encouraged to changes to BM With PCP          Beth Gipson MD

## 2024-07-05 ENCOUNTER — HOSPITAL ENCOUNTER (OUTPATIENT)
Dept: RADIOLOGY | Facility: CLINIC | Age: 59
Discharge: HOME | End: 2024-07-05
Payer: COMMERCIAL

## 2024-07-05 DIAGNOSIS — R14.0 ABDOMINAL BLOATING: ICD-10-CM

## 2024-07-05 DIAGNOSIS — R35.0 URINARY FREQUENCY: ICD-10-CM

## 2024-07-05 PROCEDURE — 76856 US EXAM PELVIC COMPLETE: CPT

## 2024-07-08 DIAGNOSIS — R35.0 URINARY FREQUENCY: Primary | ICD-10-CM

## 2024-07-08 NOTE — PROGRESS NOTES
Reviewed normal pelvic US results with patient.   Pt continues to have urinary frequency and possibly feelings of not emptying  bladder fully.  Not happening every day but more frequent.   Pt will start voiding diary and consider follow up with Urogyn.

## 2024-07-16 ENCOUNTER — SPECIALTY PHARMACY (OUTPATIENT)
Dept: PHARMACY | Facility: CLINIC | Age: 59
End: 2024-07-16

## 2024-07-16 ENCOUNTER — PHARMACY VISIT (OUTPATIENT)
Dept: PHARMACY | Facility: CLINIC | Age: 59
End: 2024-07-16
Payer: COMMERCIAL

## 2024-07-16 PROCEDURE — RXMED WILLOW AMBULATORY MEDICATION CHARGE

## 2024-07-17 ENCOUNTER — HOSPITAL ENCOUNTER (OUTPATIENT)
Dept: RADIOLOGY | Facility: CLINIC | Age: 59
Discharge: HOME | End: 2024-07-17
Payer: COMMERCIAL

## 2024-07-17 VITALS — HEIGHT: 68 IN | WEIGHT: 140 LBS | BODY MASS INDEX: 21.22 KG/M2

## 2024-07-17 DIAGNOSIS — Z12.31 BREAST CANCER SCREENING BY MAMMOGRAM: ICD-10-CM

## 2024-07-17 PROCEDURE — 77067 SCR MAMMO BI INCL CAD: CPT

## 2024-08-13 ENCOUNTER — SPECIALTY PHARMACY (OUTPATIENT)
Dept: PHARMACY | Facility: CLINIC | Age: 59
End: 2024-08-13

## 2024-08-13 PROCEDURE — RXMED WILLOW AMBULATORY MEDICATION CHARGE

## 2024-08-14 ENCOUNTER — PHARMACY VISIT (OUTPATIENT)
Dept: PHARMACY | Facility: CLINIC | Age: 59
End: 2024-08-14
Payer: COMMERCIAL

## 2024-09-10 ENCOUNTER — SPECIALTY PHARMACY (OUTPATIENT)
Dept: PHARMACY | Facility: CLINIC | Age: 59
End: 2024-09-10

## 2024-09-10 PROCEDURE — RXMED WILLOW AMBULATORY MEDICATION CHARGE

## 2024-09-19 ENCOUNTER — PHARMACY VISIT (OUTPATIENT)
Dept: PHARMACY | Facility: CLINIC | Age: 59
End: 2024-09-19
Payer: COMMERCIAL

## 2024-10-15 ENCOUNTER — APPOINTMENT (OUTPATIENT)
Dept: PRIMARY CARE | Facility: CLINIC | Age: 59
End: 2024-10-15
Payer: COMMERCIAL

## 2024-10-15 VITALS
HEART RATE: 68 BPM | DIASTOLIC BLOOD PRESSURE: 81 MMHG | BODY MASS INDEX: 22.42 KG/M2 | WEIGHT: 147.4 LBS | SYSTOLIC BLOOD PRESSURE: 119 MMHG

## 2024-10-15 DIAGNOSIS — L40.50 PSORIATIC ARTHRITIS (MULTI): ICD-10-CM

## 2024-10-15 DIAGNOSIS — D72.819 LEUKOPENIA, UNSPECIFIED TYPE: ICD-10-CM

## 2024-10-15 DIAGNOSIS — R19.4 ALTERED BOWEL HABITS: Primary | ICD-10-CM

## 2024-10-15 DIAGNOSIS — Z23 NEED FOR INFLUENZA VACCINATION: ICD-10-CM

## 2024-10-15 DIAGNOSIS — R39.15 URINARY URGENCY: ICD-10-CM

## 2024-10-15 PROBLEM — G62.9 PERIPHERAL NERVE DISEASE: Status: RESOLVED | Noted: 2024-10-15 | Resolved: 2024-10-15

## 2024-10-15 PROBLEM — M51.9 DISORDER OF INTERVERTEBRAL DISC: Status: RESOLVED | Noted: 2024-10-15 | Resolved: 2024-10-15

## 2024-10-15 PROBLEM — G44.209 MUSCLE CONTRACTION HEADACHE: Status: ACTIVE | Noted: 2024-10-15

## 2024-10-15 PROBLEM — M79.601 CHRONIC PAIN OF RIGHT UPPER EXTREMITY: Status: RESOLVED | Noted: 2024-10-15 | Resolved: 2024-10-15

## 2024-10-15 PROBLEM — G89.29 CHRONIC PAIN OF RIGHT UPPER EXTREMITY: Status: RESOLVED | Noted: 2024-10-15 | Resolved: 2024-10-15

## 2024-10-15 PROBLEM — M67.439 GANGLION OF WRIST: Status: RESOLVED | Noted: 2024-10-15 | Resolved: 2024-10-15

## 2024-10-15 PROCEDURE — 99214 OFFICE O/P EST MOD 30 MIN: CPT | Performed by: INTERNAL MEDICINE

## 2024-10-15 PROCEDURE — 90656 IIV3 VACC NO PRSV 0.5 ML IM: CPT | Performed by: INTERNAL MEDICINE

## 2024-10-15 PROCEDURE — 1036F TOBACCO NON-USER: CPT | Performed by: INTERNAL MEDICINE

## 2024-10-15 PROCEDURE — 90471 IMMUNIZATION ADMIN: CPT | Performed by: INTERNAL MEDICINE

## 2024-10-15 RX ORDER — GUAIFENESIN AND PHENYLEPHRINE HCL 400; 10 MG/1; MG/1
TABLET ORAL
COMMUNITY

## 2024-10-15 RX ORDER — TRIAMCINOLONE ACETONIDE 1 MG/ML
LOTION TOPICAL 2 TIMES DAILY
COMMUNITY
Start: 2024-06-26

## 2024-10-15 ASSESSMENT — ENCOUNTER SYMPTOMS
NAUSEA: 0
ABDOMINAL DISTENTION: 0
DIZZINESS: 0
VOMITING: 0
ANAL BLEEDING: 0
ABDOMINAL PAIN: 0
SHORTNESS OF BREATH: 0
CONSTIPATION: 0
CHEST TIGHTNESS: 0
FREQUENCY: 0
FATIGUE: 0
BLOOD IN STOOL: 0
DYSURIA: 0
ACTIVITY CHANGE: 0
LIGHT-HEADEDNESS: 0
WEAKNESS: 0
DIARRHEA: 0
HEADACHES: 0
NUMBNESS: 1
WHEEZING: 0
PALPITATIONS: 0
HEMATURIA: 0
COUGH: 0
DIFFICULTY URINATING: 0

## 2024-10-15 NOTE — PATIENT INSTRUCTIONS
Referral has been placed for pelvic floor therapy.  Please keep close follow-up with your specialist.  We are happy to see you back for your annual physical.  Please reach out with any additional questions.

## 2024-10-15 NOTE — PROGRESS NOTES
Vicki Wang comes in today for multiple concerns.        Ms. Wang comes in today with multiple concerns.  She is overdue for a wellness visit, but did not schedule as such, she was last seen over a year ago.  She does follow with a gynecologist as well as specialists including rheumatology and pain and healing specialist at an outside facility.  She describes some low back pain causing some sciatic issues.  She is again working with pain management on this and also sees rheumatology.  She continues to have a generalized numbness in her toes, no pain associated with this.  She is wondering what the source of this is, understands that this could be coming from her back and plans to talk with her rheumatologist as well to see if this could be from her immune therapy for her psoriatic arthritis.  She is curious whether something such as Lyrica would be appropriate, though she admits that there is no pain associated with this.  She would like to discuss bowel movements.  She has a normal bowel movement in the morning but then sometimes throughout the day she will have 1 or 2 smaller thinner movements after she eats something in the afternoon or sometimes even shortly after her morning bowel movement.  She is up-to-date with her colonoscopies.  There is no blood nor melena associated with this.  She does not have any abdominal cramping.  She does sometimes have some urinary urgency.  She feels as there is something in the pelvic region but has discussed this with gynecology and has had pelvic ultrasounds which were reportedly reassuring.  She is interested in pelvic floor therapy.  She denies any significant heartburn nor nausea/vomiting.  Symptoms do seem to be a bit vague, appetite is stable.  She does continue on fiber bars daily, without this she tends to be more constipated.  She has discontinued the high dosing of vitamin D that she was previously taking, taking as much is 30,000 units daily.  She is interested in  coming in for a physical and updating comprehensive lab studies as well.  She is amenable to her flu shot today.  She does follow with rheumatology for routine labs, chronic leukopenia, she believes that this is from her immune therapy, but we will plan to discuss this further with rheumatology.        Review of Systems   Constitutional:  Negative for activity change and fatigue.   Respiratory:  Negative for cough, chest tightness, shortness of breath and wheezing.    Cardiovascular:  Negative for chest pain, palpitations and leg swelling.   Gastrointestinal:  Negative for abdominal distention, abdominal pain, anal bleeding, blood in stool, constipation, diarrhea, nausea and vomiting.   Genitourinary:  Positive for urgency. Negative for difficulty urinating, dysuria, frequency, hematuria, pelvic pain, vaginal bleeding, vaginal discharge and vaginal pain.   Neurological:  Positive for numbness (in toes). Negative for dizziness, weakness, light-headedness and headaches.       Objective   Physical Exam  Constitutional:       General: She is not in acute distress.     Appearance: Normal appearance. She is not diaphoretic.   Cardiovascular:      Rate and Rhythm: Normal rate.   Pulmonary:      Effort: Pulmonary effort is normal. No respiratory distress.   Abdominal:      General: Bowel sounds are normal. There is no distension.      Palpations: Abdomen is soft. There is no mass.      Tenderness: There is no abdominal tenderness. There is no right CVA tenderness, left CVA tenderness, guarding or rebound.      Hernia: No hernia is present.   Musculoskeletal:         General: No swelling, tenderness or signs of injury.      Right lower leg: No edema.      Left lower leg: No edema.   Skin:     Coloration: Skin is not jaundiced.      Findings: No erythema.   Neurological:      General: No focal deficit present.      Mental Status: She is alert. Mental status is at baseline.      Cranial Nerves: No cranial nerve deficit.       Motor: No weakness.      Coordination: Coordination normal.      Gait: Gait normal.         Assessment/Plan   1.  Subjective altered bowel habits: Pattern does not sound unusual especially with the fiber that she is taking daily.  Colonoscopy remains up-to-date from 2021, not needing to be repeated for 10 years.  Labs recently reassuring.  No alarm symptoms associated with this and no recent change, pattern has been quite stable since she started taking in her fiber bars.  2.  Urinary urgency: She is interested in pelvic floor therapy.  Referral placed.  3.  Psoriatic arthritis: Follows closely with rheumatology specialist.  4.  Leukopenia: Moderate.  Has remained essentially stable, followed by rheumatology with immune therapies.  If worsening, could consider hematology evaluation.  5.  HCM: Have encouraged coming in for routine physical.  Flu shot provided today.    Greater than 30 minutes was spent with the patient today discussing chart review, current symptomatology, treatment options, and documentation, greater than 50% of time spent on care coordination.  We will see her back for her physical.  She is to contact us with any additional questions.  Problem List Items Addressed This Visit    None

## 2024-10-17 ENCOUNTER — SPECIALTY PHARMACY (OUTPATIENT)
Dept: PHARMACY | Facility: CLINIC | Age: 59
End: 2024-10-17

## 2024-10-17 PROCEDURE — RXMED WILLOW AMBULATORY MEDICATION CHARGE

## 2024-10-18 ENCOUNTER — PHARMACY VISIT (OUTPATIENT)
Dept: PHARMACY | Facility: CLINIC | Age: 59
End: 2024-10-18
Payer: COMMERCIAL

## 2024-11-18 PROCEDURE — RXMED WILLOW AMBULATORY MEDICATION CHARGE

## 2024-11-20 ENCOUNTER — PHARMACY VISIT (OUTPATIENT)
Dept: PHARMACY | Facility: CLINIC | Age: 59
End: 2024-11-20
Payer: COMMERCIAL

## 2024-11-20 ENCOUNTER — SPECIALTY PHARMACY (OUTPATIENT)
Dept: PHARMACY | Facility: CLINIC | Age: 59
End: 2024-11-20

## 2024-12-03 ENCOUNTER — APPOINTMENT (OUTPATIENT)
Dept: NEUROLOGY | Facility: CLINIC | Age: 59
End: 2024-12-03
Payer: COMMERCIAL

## 2024-12-17 ENCOUNTER — PHARMACY VISIT (OUTPATIENT)
Dept: PHARMACY | Facility: CLINIC | Age: 59
End: 2024-12-17
Payer: COMMERCIAL

## 2024-12-17 ENCOUNTER — SPECIALTY PHARMACY (OUTPATIENT)
Dept: PHARMACY | Facility: CLINIC | Age: 59
End: 2024-12-17

## 2024-12-17 PROCEDURE — RXMED WILLOW AMBULATORY MEDICATION CHARGE

## 2025-01-21 ENCOUNTER — SPECIALTY PHARMACY (OUTPATIENT)
Dept: PHARMACY | Facility: CLINIC | Age: 60
End: 2025-01-21

## 2025-01-21 DIAGNOSIS — G43.719 INTRACTABLE CHRONIC MIGRAINE WITHOUT AURA AND WITHOUT STATUS MIGRAINOSUS: ICD-10-CM

## 2025-01-22 ENCOUNTER — TELEMEDICINE (OUTPATIENT)
Dept: NEUROLOGY | Facility: HOSPITAL | Age: 60
End: 2025-01-22
Payer: COMMERCIAL

## 2025-01-22 DIAGNOSIS — G43.719 INTRACTABLE CHRONIC MIGRAINE WITHOUT AURA AND WITHOUT STATUS MIGRAINOSUS: Primary | ICD-10-CM

## 2025-01-22 DIAGNOSIS — R20.2 PARESTHESIA: ICD-10-CM

## 2025-01-22 PROCEDURE — 99214 OFFICE O/P EST MOD 30 MIN: CPT | Performed by: NURSE PRACTITIONER

## 2025-01-22 PROCEDURE — 1036F TOBACCO NON-USER: CPT | Performed by: NURSE PRACTITIONER

## 2025-01-22 PROCEDURE — 99214 OFFICE O/P EST MOD 30 MIN: CPT | Mod: 95 | Performed by: NURSE PRACTITIONER

## 2025-01-22 PROCEDURE — RXMED WILLOW AMBULATORY MEDICATION CHARGE

## 2025-01-22 RX ORDER — GALCANEZUMAB 120 MG/ML
INJECTION, SOLUTION SUBCUTANEOUS
Qty: 1 ML | Refills: 11 | Status: SHIPPED | OUTPATIENT
Start: 2025-01-22 | End: 2026-01-21

## 2025-01-22 NOTE — PROGRESS NOTES
[facility]    SUBJECTIVE    Vicki Wang is a 59 y.o. right-handed female who presents with   Chief Complaint   Patient presents with    Migraine        Presents for follow up visit  Visit type: virtual visit Virtual or Telephone Consent    An interactive audio and video telecommunication system which permits real time communications between the patient (at the originating site) and provider (at the distant site) was utilized to provide this telehealth service.   Verbal consent was requested and obtained from Vicki Wang on this date, 01/22/25 for a telehealth visit.     HISTORY OF PRESENT ILLNESS    RECAP:  Former patient of Dr. Shruthi Meraz for migraines.     Previous Treatment:   Abortive therapy tried: Triptans , Relpax ( caused exhaustion/tiredness), Sumatriptan, Treximet, Cambia     Prophylaxis tried:   Topamax (rash), gabapentin (dizzy, fatigued), tizanidine (fatigued) , nortriptyline (insomnia.) Has also tried butterbur and magnesium with no benefit.    Currently well controlled on Emgality 120 mg monthly.     11/28/23 - She does not currently have a migraine. Since the last visit, migraines are no change. Headaches are typically located: right-sided unilateral, left-sided unilateral, frontal. Describes as: sharp. Currently frequency: rare. Baseline frequency:  1-2 times per week. Vicki does not experience headache aura. Associated symptoms: photophobia. No increased intracranial pressure symptoms. Autonomic symptoms: None. Triggers: Patient is aware of none, used to be red wine or alcohol, no triggers currently. Her migraines do not seem to be related to any time of day or year    01/22/25 - presents on call alone today.     Very rare that getting any migraines or headaches. May get slight start of one occasionally but it self-aborts.     Will be starting on a new medication, tremfya, for her psoriatic arthritis.     Recently having some tingling in feet. States was told likely  some neuropathy. Not wanting further testing right now. B12 lower normal in past at 305 in 9/2023. Currently on Alphalipoeic acid supplement to help but does not notice any difference.     REVIEW OF SYSTEMS:  10 point review of systems performed and is negative except as noted in the HPI.     Past Medical History:   Diagnosis Date    Abnormal uterine bleeding 06/09/2023    Acquired pes planus of left foot 02/01/2023    Acquired pes planus of right foot 02/01/2023    Baker's cyst, left     Benign positional vertigo 06/09/2023    Chronic pain of right upper extremity 10/15/2024    Ganglion cyst of volar aspect of right wrist 06/09/2023    Ganglion of wrist 10/15/2024    Leukopenia 01/12/2024    Menorrhagia with regular cycle 06/09/2023    Peripheral nerve disease 10/15/2024    Posterior tibial tendinitis, left leg 02/01/2023    Posterior tibial tendinitis, right leg 06/09/2023    Rectal hemorrhage 06/09/2023    Right lateral epicondylitis 06/09/2023    Tibialis tendinitis 09/29/2022    TMJ arthropathy 06/09/2023       Family medical history includes migraines in sister.    Past Surgical History:   Procedure Laterality Date    DILATION AND CURETTAGE OF UTERUS  03/19/2018    Dilation And Curettage    GANGLION CYST EXCISION      WISDOM TOOTH EXTRACTION         Social History     Substance and Sexual Activity   Drug Use Never     Tobacco Use: Medium Risk (1/22/2025)    Patient History     Smoking Tobacco Use: Former     Smokeless Tobacco Use: Never     Passive Exposure: Not on file     Alcohol Use: Not on file       Current Outpatient Medications   Medication Instructions    galcanezumab (Emgality Pen) 120 mg/mL auto-injector INJECT 120 MG (1 PEN) UNDER THE SKIN ONCE A MONTH AS DIRECTED.    leflunomide (ARAVA) 20 mg, oral, Daily    triamcinolone (Kenalog) 0.1 % lotion Topical, 2 times daily, to affected area    turmeric root extract 500 mg capsule oral         OBJECTIVE    LMP 03/26/2020       Physical Exam  Eyes:       General: Lids are normal.      Extraocular Movements: Extraocular movements intact.   Psychiatric:         Speech: Speech normal.       Neurological Exam  Mental Status  Awake, alert and oriented to person, place and time. Speech is normal. Language is fluent with no aphasia. Attention and concentration are normal. Fund of knowledge is appropriate for level of education.    Cranial Nerves  CN III, IV, VI: Extraocular movements intact bilaterally. Normal lids and orbits bilaterally.  CN VII: Full and symmetric facial movement.  CN VIII: Hearing is normal.    Motor   No abnormal involuntary movements.        No results found for this or any previous visit from the past 1825 days.      Lab Results   Component Value Date    WBC 3.3 (L) 09/05/2023    RBC 4.54 09/05/2023    HGB 13.9 09/05/2023    HCT 42.2 09/05/2023     09/05/2023     09/05/2023    K 4.3 09/05/2023     09/05/2023    BUN 18 09/05/2023    CREATININE 0.96 09/05/2023    CALCIUM 9.6 09/05/2023    MG 2.18 09/05/2023    CQMMSJQB14 305 09/05/2023    VITD25 144 (A) 07/06/2023    HGBA1C 5.0 07/06/2023    CHOL 238 (H) 07/06/2023    HDL 62.2 07/06/2023    TRIG 86 07/06/2023    TSH 3.85 09/05/2023          ASSESSMENT & PLAN  Problem List Items Addressed This Visit       Intractable chronic migraine without aura and without status migrainosus - Primary    Overview     Currently well controlled on Emgality monthly injection.   Failed topiramate, gabapentin, nortriptyline.  Failed relpax, sumatriptan, Treximet, Cambia and tizanidine.          Relevant Orders    Follow Up In Neurology    Paresthesia     Continue Emgality, refills sent.   Discussed diagnosis and treatment options for possible neuropathy. Reviewed prior B12 levels with the patient, explained that could supplement as she has been borderline low and low B12 can contribute to neuropathy symptoms    FU in 1 year         Erlinda Lea, APRN-CNP

## 2025-01-22 NOTE — PATIENT INSTRUCTIONS
Migraines:  Suggestions for preventing or controlling migraines:  - Riboflavin (vitamin B2) 200 mg twice a day may be helpful. Side effects can include diarrhea, increased urination and bright yellow urine. This should not be taken by kids.   - CoQ10 100 mg daily up to three times per day may also be helpful. Side effects can include nausea, diarrhea, and dyspepsia.   - Magnesium (oxelate) 400- 600 mg daily for prevention. Magnesium can also help with menstrual migraines. Side effects can include diarrhea and flushing. Magnesium glycinate is a good option to take at night, less GI side effects and can help you sleep - it is more highly absorbed than the other formulations of magnesium.  - According to the American Academy of Neu[employer], there is not sufficient evidence that melatonin helps prevent or treat migraines, so it is not currently recommended for this use. Butterbur is also not currently recommended for migraine prevention as it can cause liver toxicity.   - Avoid triggers that can cause or worsen migraines (food, lack of sleep, stress, etc.)  - Headache frequency is noted to be increased in those with low physical activity, poor sleep, high BMI, smoking, and caffeine overuse. Managing stress with relaxation techniques and getting 20-30 minutes of aerobic exercise at least 4 times per week can help decrease headache frequency and intensity.   - Keep a diary of your headaches to note triggers, how often you get them, how long they last, associated symptoms, what medicines you took and if they helped, and any other helpful information   - Avoid taking rescue medication (NOT preventative medication) more than 3 days a week (includes both prescription and over the counter meds)  - Take your preventative medication as directed. Let me know if you have side effects or problems with the medication. Do not suddenly stop the medication.

## 2025-01-27 ENCOUNTER — PHARMACY VISIT (OUTPATIENT)
Dept: PHARMACY | Facility: CLINIC | Age: 60
End: 2025-01-27
Payer: COMMERCIAL

## 2025-02-24 ENCOUNTER — SPECIALTY PHARMACY (OUTPATIENT)
Dept: PHARMACY | Facility: CLINIC | Age: 60
End: 2025-02-24

## 2025-02-24 PROCEDURE — RXMED WILLOW AMBULATORY MEDICATION CHARGE

## 2025-02-25 ENCOUNTER — PHARMACY VISIT (OUTPATIENT)
Dept: PHARMACY | Facility: CLINIC | Age: 60
End: 2025-02-25
Payer: COMMERCIAL

## 2025-03-03 ENCOUNTER — APPOINTMENT (OUTPATIENT)
Dept: PRIMARY CARE | Facility: CLINIC | Age: 60
End: 2025-03-03
Payer: COMMERCIAL

## 2025-03-27 ENCOUNTER — SPECIALTY PHARMACY (OUTPATIENT)
Dept: PHARMACY | Facility: CLINIC | Age: 60
End: 2025-03-27

## 2025-03-27 PROCEDURE — RXMED WILLOW AMBULATORY MEDICATION CHARGE

## 2025-03-28 ENCOUNTER — PHARMACY VISIT (OUTPATIENT)
Dept: PHARMACY | Facility: CLINIC | Age: 60
End: 2025-03-28
Payer: COMMERCIAL

## 2025-04-29 ENCOUNTER — SPECIALTY PHARMACY (OUTPATIENT)
Dept: PHARMACY | Facility: CLINIC | Age: 60
End: 2025-04-29

## 2025-04-29 PROCEDURE — RXMED WILLOW AMBULATORY MEDICATION CHARGE

## 2025-04-30 ENCOUNTER — PHARMACY VISIT (OUTPATIENT)
Dept: PHARMACY | Facility: CLINIC | Age: 60
End: 2025-04-30
Payer: COMMERCIAL

## 2025-05-29 ENCOUNTER — SPECIALTY PHARMACY (OUTPATIENT)
Dept: PHARMACY | Facility: CLINIC | Age: 60
End: 2025-05-29

## 2025-05-29 ENCOUNTER — PHARMACY VISIT (OUTPATIENT)
Dept: PHARMACY | Facility: CLINIC | Age: 60
End: 2025-05-29
Payer: COMMERCIAL

## 2025-05-29 PROCEDURE — RXMED WILLOW AMBULATORY MEDICATION CHARGE

## 2025-06-30 ENCOUNTER — SPECIALTY PHARMACY (OUTPATIENT)
Dept: PHARMACY | Facility: CLINIC | Age: 60
End: 2025-06-30

## 2025-06-30 PROCEDURE — RXMED WILLOW AMBULATORY MEDICATION CHARGE

## 2025-07-01 ENCOUNTER — PHARMACY VISIT (OUTPATIENT)
Dept: PHARMACY | Facility: CLINIC | Age: 60
End: 2025-07-01
Payer: COMMERCIAL

## 2025-08-04 ENCOUNTER — PHARMACY VISIT (OUTPATIENT)
Dept: PHARMACY | Facility: CLINIC | Age: 60
End: 2025-08-04
Payer: COMMERCIAL

## 2025-08-04 ENCOUNTER — SPECIALTY PHARMACY (OUTPATIENT)
Dept: PHARMACY | Facility: CLINIC | Age: 60
End: 2025-08-04

## 2025-08-04 PROCEDURE — RXMED WILLOW AMBULATORY MEDICATION CHARGE

## 2025-09-04 ENCOUNTER — SPECIALTY PHARMACY (OUTPATIENT)
Dept: PHARMACY | Facility: CLINIC | Age: 60
End: 2025-09-04

## 2025-09-04 PROCEDURE — RXMED WILLOW AMBULATORY MEDICATION CHARGE

## 2025-09-05 ENCOUNTER — PHARMACY VISIT (OUTPATIENT)
Dept: PHARMACY | Facility: CLINIC | Age: 60
End: 2025-09-05
Payer: COMMERCIAL